# Patient Record
Sex: FEMALE | Race: WHITE | NOT HISPANIC OR LATINO | Employment: FULL TIME | URBAN - METROPOLITAN AREA
[De-identification: names, ages, dates, MRNs, and addresses within clinical notes are randomized per-mention and may not be internally consistent; named-entity substitution may affect disease eponyms.]

---

## 2023-08-28 ENCOUNTER — APPOINTMENT (OUTPATIENT)
Dept: GENERAL RADIOLOGY | Facility: HOSPITAL | Age: 58
End: 2023-08-28
Payer: COMMERCIAL

## 2023-08-28 ENCOUNTER — HOSPITAL ENCOUNTER (EMERGENCY)
Facility: HOSPITAL | Age: 58
Discharge: HOME | End: 2023-08-28
Attending: EMERGENCY MEDICINE | Admitting: EMERGENCY MEDICINE
Payer: COMMERCIAL

## 2023-08-28 VITALS
OXYGEN SATURATION: 98 % | WEIGHT: 164 LBS | HEART RATE: 58 BPM | BODY MASS INDEX: 26.36 KG/M2 | TEMPERATURE: 97.1 F | DIASTOLIC BLOOD PRESSURE: 89 MMHG | RESPIRATION RATE: 17 BRPM | SYSTOLIC BLOOD PRESSURE: 147 MMHG | HEIGHT: 66 IN

## 2023-08-28 DIAGNOSIS — R07.89 ATYPICAL CHEST PAIN: Primary | ICD-10-CM

## 2023-08-28 DIAGNOSIS — R51.9 ACUTE NONINTRACTABLE HEADACHE, UNSPECIFIED HEADACHE TYPE: ICD-10-CM

## 2023-08-28 LAB
ALBUMIN SERPL-MCNC: 4.9 G/DL (ref 3.5–5.2)
ALBUMIN/GLOB SERPL: 2.3 G/DL
ALP SERPL-CCNC: 71 U/L (ref 39–117)
ALT SERPL W P-5'-P-CCNC: 22 U/L (ref 1–33)
ANION GAP SERPL CALCULATED.3IONS-SCNC: 10 MMOL/L (ref 5–15)
AST SERPL-CCNC: 20 U/L (ref 1–32)
BASOPHILS # BLD AUTO: 0.04 10*3/MM3 (ref 0–0.2)
BASOPHILS NFR BLD AUTO: 0.5 % (ref 0–1.5)
BILIRUB SERPL-MCNC: 0.4 MG/DL (ref 0–1.2)
BUN SERPL-MCNC: 9 MG/DL (ref 6–20)
BUN/CREAT SERPL: 13.4 (ref 7–25)
CALCIUM SPEC-SCNC: 9.8 MG/DL (ref 8.6–10.5)
CHLORIDE SERPL-SCNC: 108 MMOL/L (ref 98–107)
CO2 SERPL-SCNC: 24 MMOL/L (ref 22–29)
CREAT SERPL-MCNC: 0.67 MG/DL (ref 0.57–1)
DEPRECATED RDW RBC AUTO: 47.3 FL (ref 37–54)
EGFRCR SERPLBLD CKD-EPI 2021: 103.4 ML/MIN/1.73
EOSINOPHIL # BLD AUTO: 0.06 10*3/MM3 (ref 0–0.4)
EOSINOPHIL NFR BLD AUTO: 0.7 % (ref 0.3–6.2)
ERYTHROCYTE [DISTWIDTH] IN BLOOD BY AUTOMATED COUNT: 13.4 % (ref 12.3–15.4)
GLOBULIN UR ELPH-MCNC: 2.1 GM/DL
GLUCOSE SERPL-MCNC: 105 MG/DL (ref 65–99)
HCT VFR BLD AUTO: 40.4 % (ref 34–46.6)
HGB BLD-MCNC: 13.7 G/DL (ref 12–15.9)
HOLD SPECIMEN: NORMAL
HOLD SPECIMEN: NORMAL
IMM GRANULOCYTES # BLD AUTO: 0.01 10*3/MM3 (ref 0–0.05)
IMM GRANULOCYTES NFR BLD AUTO: 0.1 % (ref 0–0.5)
LYMPHOCYTES # BLD AUTO: 2.16 10*3/MM3 (ref 0.7–3.1)
LYMPHOCYTES NFR BLD AUTO: 25.9 % (ref 19.6–45.3)
MCH RBC QN AUTO: 32.2 PG (ref 26.6–33)
MCHC RBC AUTO-ENTMCNC: 33.9 G/DL (ref 31.5–35.7)
MCV RBC AUTO: 95.1 FL (ref 79–97)
MONOCYTES # BLD AUTO: 0.23 10*3/MM3 (ref 0.1–0.9)
MONOCYTES NFR BLD AUTO: 2.8 % (ref 5–12)
NEUTROPHILS NFR BLD AUTO: 5.83 10*3/MM3 (ref 1.7–7)
NEUTROPHILS NFR BLD AUTO: 70 % (ref 42.7–76)
NRBC BLD AUTO-RTO: 0 /100 WBC (ref 0–0.2)
PLATELET # BLD AUTO: 206 10*3/MM3 (ref 140–450)
PMV BLD AUTO: 10.8 FL (ref 6–12)
POTASSIUM SERPL-SCNC: 4.1 MMOL/L (ref 3.5–5.2)
PROT SERPL-MCNC: 7 G/DL (ref 6–8.5)
QT INTERVAL: 402 MS
QTC INTERVAL: 392 MS
RBC # BLD AUTO: 4.25 10*6/MM3 (ref 3.77–5.28)
SODIUM SERPL-SCNC: 142 MMOL/L (ref 136–145)
TROPONIN T SERPL HS-MCNC: 7 NG/L
WBC NRBC COR # BLD: 8.33 10*3/MM3 (ref 3.4–10.8)
WHOLE BLOOD HOLD COAG: NORMAL
WHOLE BLOOD HOLD SPECIMEN: NORMAL

## 2023-08-28 PROCEDURE — 93005 ELECTROCARDIOGRAM TRACING: CPT

## 2023-08-28 PROCEDURE — 71045 X-RAY EXAM CHEST 1 VIEW: CPT

## 2023-08-28 PROCEDURE — 36415 COLL VENOUS BLD VENIPUNCTURE: CPT

## 2023-08-28 PROCEDURE — 85025 COMPLETE CBC W/AUTO DIFF WBC: CPT

## 2023-08-28 PROCEDURE — 93005 ELECTROCARDIOGRAM TRACING: CPT | Performed by: EMERGENCY MEDICINE

## 2023-08-28 PROCEDURE — 80053 COMPREHEN METABOLIC PANEL: CPT

## 2023-08-28 PROCEDURE — 84484 ASSAY OF TROPONIN QUANT: CPT

## 2023-08-28 PROCEDURE — 99284 EMERGENCY DEPT VISIT MOD MDM: CPT

## 2023-08-28 RX ORDER — SUCRALFATE 1 G/1
1 TABLET ORAL
Qty: 90 TABLET | Refills: 0 | Status: SHIPPED | OUTPATIENT
Start: 2023-08-28

## 2023-08-28 RX ORDER — OMEPRAZOLE 40 MG/1
40 CAPSULE, DELAYED RELEASE ORAL DAILY
Qty: 30 CAPSULE | Refills: 0 | Status: SHIPPED | OUTPATIENT
Start: 2023-08-28

## 2023-08-28 RX ORDER — SODIUM CHLORIDE 0.9 % (FLUSH) 0.9 %
10 SYRINGE (ML) INJECTION AS NEEDED
Status: DISCONTINUED | OUTPATIENT
Start: 2023-08-28 | End: 2023-08-28 | Stop reason: HOSPADM

## 2023-08-28 RX ORDER — ASPIRIN 325 MG
325 TABLET ORAL ONCE
Status: DISCONTINUED | OUTPATIENT
Start: 2023-08-28 | End: 2023-08-28

## 2023-08-28 NOTE — ED PROVIDER NOTES
EMERGENCY DEPARTMENT ENCOUNTER    Room Number:  14/14  PCP: Lynne Banegas DO  Historian: Patient      HPI:  Chief Complaint: Chest pain, headache  A complete HPI/ROS/PMH/PSH/SH/FH are unobtainable due to: Nothing  Context: Mackenzie Mendiola is a 57 y.o. female who presents to the ED c/o multiple complaints.    Patient states that on Thursday while she was driving her granddaughter to school she got really hot and had to crank the air conditioning up, roll the windows up to cool the car down, and she also had some associated lightheadedness.  She called her friend but she did not pull over.  She reports that the symptoms gradually resolved.  She denies palpitations or shortness of breath associated with that episode.    [place] that her chest has been bothering a little bit.  She felt like it may have been acid but she was taking Tums and was not getting total relief.  She had a little bit of left scapular pain times but she thinks that may have been muscular as she does have chronic back problems.  She denies any tingling, numbness, weakness in her extremities.  No shortness of breath.  The pain was not exacerbated by exertion.  She denies associated nausea, vomiting, diaphoresis.  That pain has mostly resolved now, she still does complains of a little bit of a burning discomfort in the center of her chest.  She does have a history of hypertension and hyperlipidemia for which she takes lisinopril and atorvastatin.  She continues to smoke.  She has maternal history of atrial fibrillation but not family history of coronary disease to her knowledge.  She does have a cardiologist, Dr. Mckinnon, who she saw about a year ago due to her hypertension and hyperlipidemia.  She relates that she had an echo at that time and was told it looked good.    [place] that she had some squiggly lines in her vision in her right eye.  That lasted maybe 30  minutes and then seemed to subside prior to going to bed.  She rested well last night when she woke up she had a bad headache on the left side of her head.  She drank some coffee and took a couple of ibuprofen and the headache subsided.  She denies associated nausea or vomiting.  No focal weakness or sensory changes.  No speech disturbance.  She denies any visual disturbance today.  She denies history of headaches or prior diagnosis of migraines.  No neck pain or neck stiffness.            PAST MEDICAL HISTORY  Active Ambulatory Problems     Diagnosis Date Noted    No Active Ambulatory Problems     Resolved Ambulatory Problems     Diagnosis Date Noted    No Resolved Ambulatory Problems     No Additional Past Medical History         PAST SURGICAL HISTORY  No past surgical history on file.      FAMILY HISTORY  No family history on file.      SOCIAL HISTORY  Social History     Socioeconomic History    Marital status:          ALLERGIES  Patient has no allergy information on record.        REVIEW OF SYSTEMS  Review of Systems   Review of all 14 systems is negative other than stated in the HPI above.      PHYSICAL EXAM  ED Triage Vitals   Temp Heart Rate Resp BP SpO2   08/28/23 1522 08/28/23 1522 08/28/23 1522 08/28/23 1538 08/28/23 1522   97.1 øF (36.2 øC) 65 16 161/84 100 %      Temp src Heart Rate Source Patient Position BP Location FiO2 (%)   08/28/23 1522 08/28/23 1522 -- -- --   Tympanic Monitor          Physical Exam      GENERAL: Awake and alert, well-appearing, no acute distress  HENT: nares patent, oropharynx clear, full range of motion of the neck  EYES: no scleral icterus, pupils 3 mm reactive bilaterally, EOMI, visual fields full, no nystagmus  CV: regular rhythm, normal rate, no murmur, no peripheral edema  RESPIRATORY: normal effort, lungs clear to auscultation bilaterally  ABDOMEN: soft, nondistended, nontender throughout  MUSCULOSKELETAL: no deformity, no calf tenderness present bilaterally.   There is no midline C or T-spine tenderness.  NEURO: alert, moves all extremities, follows commands.  GCS 15.  Speech fluent and clear.  Cranial nerves II through XII grossly intact.  Normal sensation to light touch throughout all extremities.  PSYCH:  calm, cooperative  SKIN: warm, dry, no rash    Vital signs and nursing notes reviewed.          LAB RESULTS  Recent Results (from the past 24 hour(s))   ECG 12 Lead Chest Pain    Collection Time: 08/28/23  3:26 PM   Result Value Ref Range    QT Interval 402 ms    QTC Interval 392 ms   Comprehensive Metabolic Panel    Collection Time: 08/28/23  4:07 PM    Specimen: Arm, Right; Blood   Result Value Ref Range    Glucose 105 (H) 65 - 99 mg/dL    BUN 9 6 - 20 mg/dL    Creatinine 0.67 0.57 - 1.00 mg/dL    Sodium 142 136 - 145 mmol/L    Potassium 4.1 3.5 - 5.2 mmol/L    Chloride 108 (H) 98 - 107 mmol/L    CO2 24.0 22.0 - 29.0 mmol/L    Calcium 9.8 8.6 - 10.5 mg/dL    Total Protein 7.0 6.0 - 8.5 g/dL    Albumin 4.9 3.5 - 5.2 g/dL    ALT (SGPT) 22 1 - 33 U/L    AST (SGOT) 20 1 - 32 U/L    Alkaline Phosphatase 71 39 - 117 U/L    Total Bilirubin 0.4 0.0 - 1.2 mg/dL    Globulin 2.1 gm/dL    A/G Ratio 2.3 g/dL    BUN/Creatinine Ratio 13.4 7.0 - 25.0    Anion Gap 10.0 5.0 - 15.0 mmol/L    eGFR 103.4 >60.0 mL/min/1.73   High Sensitivity Troponin T    Collection Time: 08/28/23  4:07 PM    Specimen: Arm, Right; Blood   Result Value Ref Range    HS Troponin T 7 <10 ng/L   Green Top (Gel)    Collection Time: 08/28/23  4:07 PM   Result Value Ref Range    Extra Tube Hold for add-ons.    Lavender Top    Collection Time: 08/28/23  4:07 PM   Result Value Ref Range    Extra Tube hold for add-on    Gold Top - SST    Collection Time: 08/28/23  4:07 PM   Result Value Ref Range    Extra Tube Hold for add-ons.    Light Blue Top    Collection Time: 08/28/23  4:07 PM   Result Value Ref Range    Extra Tube Hold for add-ons.    CBC Auto Differential    Collection Time: 08/28/23  4:07 PM    Specimen:  Arm, Right; Blood   Result Value Ref Range    WBC 8.33 3.40 - 10.80 10*3/mm3    RBC 4.25 3.77 - 5.28 10*6/mm3    Hemoglobin 13.7 12.0 - 15.9 g/dL    Hematocrit 40.4 34.0 - 46.6 %    MCV 95.1 79.0 - 97.0 fL    MCH 32.2 26.6 - 33.0 pg    MCHC 33.9 31.5 - 35.7 g/dL    RDW 13.4 12.3 - 15.4 %    RDW-SD 47.3 37.0 - 54.0 fl    MPV 10.8 6.0 - 12.0 fL    Platelets 206 140 - 450 10*3/mm3    Neutrophil % 70.0 42.7 - 76.0 %    Lymphocyte % 25.9 19.6 - 45.3 %    Monocyte % 2.8 (L) 5.0 - 12.0 %    Eosinophil % 0.7 0.3 - 6.2 %    Basophil % 0.5 0.0 - 1.5 %    Immature Grans % 0.1 0.0 - 0.5 %    Neutrophils, Absolute 5.83 1.70 - 7.00 10*3/mm3    Lymphocytes, Absolute 2.16 0.70 - 3.10 10*3/mm3    Monocytes, Absolute 0.23 0.10 - 0.90 10*3/mm3    Eosinophils, Absolute 0.06 0.00 - 0.40 10*3/mm3    Basophils, Absolute 0.04 0.00 - 0.20 10*3/mm3    Immature Grans, Absolute 0.01 0.00 - 0.05 10*3/mm3    nRBC 0.0 0.0 - 0.2 /100 WBC       Ordered the above labs and reviewed the results.        RADIOLOGY  XR Chest 1 View    Result Date: 8/28/2023  PORTABLE CHEST  HISTORY: Chest pain.  COMPARISON: None.  FINDINGS: The heart is within normal limits in size. There is no evidence of infiltrate, effusion or of congestive failure.       Ordered the above noted radiological studies. Reviewed by me in PACS.            PROCEDURES  Procedures            MEDICATIONS GIVEN IN ER  Medications   sodium chloride 0.9 % flush 10 mL (has no administration in time range)   aspirin tablet 325 mg (has no administration in time range)                   MEDICAL DECISION MAKING, PROGRESS, and CONSULTS    All labs have been independently reviewed by me.  All radiology studies have been reviewed by me and I have also reviewed the radiology report.   EKG's independently viewed and interpreted by me.  Discussion below represents my analysis of pertinent findings related to patient's condition, differential diagnosis, treatment plan and final disposition.        Orders  placed during this visit:  Orders Placed This Encounter   Procedures    XR Chest 1 View    [place] Draw    Comprehensive Metabolic Panel    High Sensitivity Troponin T    CBC Auto Differential    NPO Diet NPO Type: Strict NPO    Undress & Gown    Continuous Pulse Oximetry    Oxygen Therapy- Nasal Cannula; Titrate 1-6 LPM Per SpO2; 90 - 95%    ECG 12 Lead Chest Pain    ECG 12 Lead ED Triage Standing Order; Chest Pain    Insert Peripheral IV    CBC & Differential    Green Top (Gel)    Lavender Top    Gold Top - SST    Light Blue Top         Differential diagnosis includes but is not limited to:    Migraine headache  GERD with esophagitis  Acute coronary syndrome  Pneumonia  Pericarditis  Hiatal hernia        Independent interpretation of labs, radiology studies, and discussions with consultants:  ED Course as of 08/28/23 1811   Mon Aug 28, 2023   1727 HS Troponin T: 7 [JR]   1727 WBC: 8.33 [JR]   1727 Hemoglobin: 13.7 [JR]   1727 Sodium: 142 [JR]   1727 Creatinine: 0.67 [JR]   1727 Glucose(!): 105 [JR]   1727 EKG independently interpreted by me:  Time: 1526  Rate and rhythm: Sinus rhythm, 57  [place]: Normal  QRS: Normal axis  ST and T waves: No acute ischemic changes [JR]   1728 Chest x-ray independently interpreted in PACS and demonstrates no infiltrate, no pleural effusion. [JR]      ED Course User Index  [JR] Torrey Cabrera MD     57-year-old female presents with multiple complaints.  All symptoms have resolved at this time.  Her EKG demonstrates no ischemic changes and troponin within normal limits and she has had some intermittent chest discomfort for several days, no repeat troponin indicated.  HEART score 3 due to atypical symptoms, age 57, risk factors including hypertension, hyperlipidemia, smoking.  I actually think that her symptoms are more likely related to GERD and esophagitis.  I recommended we trial daily omeprazole and Carafate as needed.  I also recommended that she follow-up with her  cardiologist, Dr. Mckinnon, for further evaluation of her symptoms per his expertise.  I discussed return precautions in the interim.    Not certain what caused her to feel hot and lightheaded when driving last Thursday.  That was during the recent heat wave with temperatures exceeding 100 degrees.  It may have been related to heat exposure or dehydration.  I explained that if symptoms persisted she may need further evaluation with a Holter monitor or other evaluation.      Her headache and transient vision disturbance that she experienced yesterday actually seems pretty classic for migraine however she has no prior history.  Her headache resolved with some caffeine and ibuprofen.  She has no focal neurologic symptoms.  I explained that I do not think that a CT of the brain would be helpful at this time.  I recommend that she follow-up closely with her PCP and return precautions were discussed.            DIAGNOSIS  Final diagnoses:   Atypical chest pain   Acute nonintractable headache, unspecified headache type         DISPOSITION  DISCHARGE    Patient discharged in stable condition.    Reviewed implications of results, diagnosis, meds, responsibility to follow up, warning signs and symptoms of possible worsening, potential complications and reasons to return to ER.    Patient/Family voiced understanding of above instructions.    Discussed plan for discharge, as there is no emergent indication for admission. Patient referred to primary care provider for BP management due to today's BP. Pt/family is agreeable and understands need for follow up and repeat testing.  Pt is aware that discharge does not mean that nothing is wrong but it indicates no emergency is present that requires admission and they must continue care with follow-up as given below or physician of their choice.     FOLLOW-UP  Lynne Banegas DO  [address] [place] [id number] [phone]    Schedule an appointment as soon as possible for  a visit       Milad Bailey MD  [address] [facility] [address]  [place] [address]  [phone]    Schedule an appointment as soon as possible for a visit            Medication List        New Prescriptions      omeprazole 40 MG capsule  Commonly known as: priLOSEC  Take 1 capsule by mouth Daily.     sucralfate 1 g tablet  Commonly known as: CARAFATE  Take 1 tablet by mouth 4 (Four) Times a Day Before Meals & at Bedtime As Needed (abdominal/ chest pain).               Where to Get Your Medications        These medications were sent to [facility] [id number] - [place], [place] - [address] [place] [address] AT [facility] - [phone]  - [phone] [place] [id number]      Phone: [phone]   omeprazole 40 MG capsule  sucralfate 1 g tablet                   Latest Documented Vital Signs:  As of 18:11 EDT  BP- 161/84 HR- 65 Temp- 97.1 øF (36.2 øC) (Tympanic) O2 sat- 100%              --    Please note that portions of this were completed with a voice recognition program.       Note Disclaimer: At [facility], we believe that sharing information builds trust and better relationships. You are receiving this note because you are receiving care at [facility] or recently visited. It is possible you will see health information before a provider has talked with you about it. This kind of information can be easy to misunderstand. To help you fully understand what it means for your health, we urge you to discuss this note with your provider.             Torrey Cabrera MD  08/28/23 7242